# Patient Record
Sex: FEMALE | Race: WHITE | NOT HISPANIC OR LATINO
[De-identification: names, ages, dates, MRNs, and addresses within clinical notes are randomized per-mention and may not be internally consistent; named-entity substitution may affect disease eponyms.]

---

## 2017-04-21 PROBLEM — Z00.00 ENCOUNTER FOR PREVENTIVE HEALTH EXAMINATION: Status: ACTIVE | Noted: 2017-04-21

## 2017-04-26 ENCOUNTER — APPOINTMENT (OUTPATIENT)
Dept: COLORECTAL SURGERY | Facility: CLINIC | Age: 37
End: 2017-04-26

## 2017-04-26 VITALS
DIASTOLIC BLOOD PRESSURE: 84 MMHG | BODY MASS INDEX: 24.84 KG/M2 | SYSTOLIC BLOOD PRESSURE: 149 MMHG | HEIGHT: 62 IN | WEIGHT: 135 LBS | HEART RATE: 106 BPM | TEMPERATURE: 99 F | RESPIRATION RATE: 14 BRPM

## 2017-04-26 DIAGNOSIS — Z86.39 PERSONAL HISTORY OF OTHER ENDOCRINE, NUTRITIONAL AND METABOLIC DISEASE: ICD-10-CM

## 2017-04-26 DIAGNOSIS — Z87.09 PERSONAL HISTORY OF OTHER DISEASES OF THE RESPIRATORY SYSTEM: ICD-10-CM

## 2017-04-27 PROBLEM — Z86.39 HISTORY OF THYROID DISEASE: Status: RESOLVED | Noted: 2017-04-26 | Resolved: 2017-04-27

## 2017-04-27 PROBLEM — Z87.09 HISTORY OF ASTHMA: Status: RESOLVED | Noted: 2017-04-26 | Resolved: 2017-04-27

## 2017-05-06 ENCOUNTER — OUTPATIENT (OUTPATIENT)
Dept: OUTPATIENT SERVICES | Facility: HOSPITAL | Age: 37
LOS: 1 days | End: 2017-05-06
Payer: COMMERCIAL

## 2017-05-06 ENCOUNTER — APPOINTMENT (OUTPATIENT)
Dept: MAMMOGRAPHY | Facility: CLINIC | Age: 37
End: 2017-05-06

## 2017-05-06 DIAGNOSIS — Z00.8 ENCOUNTER FOR OTHER GENERAL EXAMINATION: ICD-10-CM

## 2017-05-06 DIAGNOSIS — Z12.31 ENCOUNTER FOR SCREENING MAMMOGRAM FOR MALIGNANT NEOPLASM OF BREAST: ICD-10-CM

## 2017-05-06 PROCEDURE — 77063 BREAST TOMOSYNTHESIS BI: CPT

## 2017-05-06 PROCEDURE — 77067 SCR MAMMO BI INCL CAD: CPT

## 2017-05-18 ENCOUNTER — APPOINTMENT (OUTPATIENT)
Dept: COLORECTAL SURGERY | Facility: CLINIC | Age: 37
End: 2017-05-18

## 2017-06-01 ENCOUNTER — OUTPATIENT (OUTPATIENT)
Dept: OUTPATIENT SERVICES | Facility: HOSPITAL | Age: 37
LOS: 1 days | End: 2017-06-01
Payer: COMMERCIAL

## 2017-06-01 ENCOUNTER — APPOINTMENT (OUTPATIENT)
Dept: ULTRASOUND IMAGING | Facility: CLINIC | Age: 37
End: 2017-06-01

## 2017-06-01 DIAGNOSIS — Z00.8 ENCOUNTER FOR OTHER GENERAL EXAMINATION: ICD-10-CM

## 2017-06-01 PROCEDURE — 76641 ULTRASOUND BREAST COMPLETE: CPT

## 2017-06-08 ENCOUNTER — APPOINTMENT (OUTPATIENT)
Dept: COLORECTAL SURGERY | Facility: CLINIC | Age: 37
End: 2017-06-08

## 2017-06-08 VITALS
BODY MASS INDEX: 24.84 KG/M2 | HEART RATE: 98 BPM | RESPIRATION RATE: 14 BRPM | WEIGHT: 135 LBS | TEMPERATURE: 98 F | SYSTOLIC BLOOD PRESSURE: 120 MMHG | DIASTOLIC BLOOD PRESSURE: 71 MMHG | HEIGHT: 62 IN

## 2017-06-08 DIAGNOSIS — R10.31 RIGHT LOWER QUADRANT PAIN: ICD-10-CM

## 2017-06-17 ENCOUNTER — APPOINTMENT (OUTPATIENT)
Dept: ULTRASOUND IMAGING | Facility: CLINIC | Age: 37
End: 2017-06-17

## 2017-06-17 ENCOUNTER — OUTPATIENT (OUTPATIENT)
Dept: OUTPATIENT SERVICES | Facility: HOSPITAL | Age: 37
LOS: 1 days | End: 2017-06-17
Payer: COMMERCIAL

## 2017-06-17 DIAGNOSIS — R10.31 RIGHT LOWER QUADRANT PAIN: ICD-10-CM

## 2017-06-17 DIAGNOSIS — Z00.8 ENCOUNTER FOR OTHER GENERAL EXAMINATION: ICD-10-CM

## 2017-06-17 PROCEDURE — 76705 ECHO EXAM OF ABDOMEN: CPT

## 2017-06-21 ENCOUNTER — OTHER (OUTPATIENT)
Age: 37
End: 2017-06-21

## 2017-10-20 ENCOUNTER — APPOINTMENT (OUTPATIENT)
Dept: RADIOLOGY | Facility: CLINIC | Age: 37
End: 2017-10-20
Payer: COMMERCIAL

## 2017-10-20 ENCOUNTER — OUTPATIENT (OUTPATIENT)
Dept: OUTPATIENT SERVICES | Facility: HOSPITAL | Age: 37
LOS: 1 days | End: 2017-10-20
Payer: COMMERCIAL

## 2017-10-20 DIAGNOSIS — Z00.8 ENCOUNTER FOR OTHER GENERAL EXAMINATION: ICD-10-CM

## 2017-10-20 DIAGNOSIS — J45.20 MILD INTERMITTENT ASTHMA, UNCOMPLICATED: ICD-10-CM

## 2017-10-20 PROCEDURE — 71046 X-RAY EXAM CHEST 2 VIEWS: CPT

## 2017-10-20 PROCEDURE — 71020: CPT | Mod: 26

## 2019-01-15 ENCOUNTER — RECORD ABSTRACTING (OUTPATIENT)
Age: 39
End: 2019-01-15

## 2019-01-15 DIAGNOSIS — Z30.09 ENCOUNTER FOR OTHER GENERAL COUNSELING AND ADVICE ON CONTRACEPTION: ICD-10-CM

## 2019-01-15 DIAGNOSIS — Z78.9 OTHER SPECIFIED HEALTH STATUS: ICD-10-CM

## 2019-01-15 LAB — CYTOLOGY CVX/VAG DOC THIN PREP: NORMAL

## 2019-01-15 RX ORDER — MULTIVITAMIN
TABLET ORAL
Refills: 0 | Status: ACTIVE | COMMUNITY

## 2019-02-26 ENCOUNTER — APPOINTMENT (OUTPATIENT)
Dept: OBGYN | Facility: CLINIC | Age: 39
End: 2019-02-26
Payer: COMMERCIAL

## 2019-02-26 VITALS
SYSTOLIC BLOOD PRESSURE: 116 MMHG | BODY MASS INDEX: 24.84 KG/M2 | WEIGHT: 135 LBS | HEIGHT: 62 IN | DIASTOLIC BLOOD PRESSURE: 78 MMHG

## 2019-02-26 DIAGNOSIS — E05.00 THYROTOXICOSIS WITH DIFFUSE GOITER W/OUT THYROTOXIC CRISIS OR STORM: ICD-10-CM

## 2019-02-26 DIAGNOSIS — D64.9 ANEMIA, UNSPECIFIED: ICD-10-CM

## 2019-02-26 DIAGNOSIS — N92.0 EXCESSIVE AND FREQUENT MENSTRUATION WITH REGULAR CYCLE: ICD-10-CM

## 2019-02-26 DIAGNOSIS — Z01.419 ENCOUNTER FOR GYNECOLOGICAL EXAMINATION (GENERAL) (ROUTINE) W/OUT ABNORMAL FINDINGS: ICD-10-CM

## 2019-02-26 LAB
BILIRUB UR QL STRIP: NORMAL
GLUCOSE UR-MCNC: NORMAL
HCG UR QL: 0.2 EU/DL
HCG UR QL: POSITIVE
HGB UR QL STRIP.AUTO: ABNORMAL
KETONES UR-MCNC: ABNORMAL
LEUKOCYTE ESTERASE UR QL STRIP: NORMAL
NITRITE UR QL STRIP: NORMAL
PH UR STRIP: 5
PROT UR STRIP-MCNC: NORMAL
QUALITY CONTROL: YES
SP GR UR STRIP: 1.02

## 2019-02-26 PROCEDURE — 99395 PREV VISIT EST AGE 18-39: CPT

## 2019-02-26 PROCEDURE — 81003 URINALYSIS AUTO W/O SCOPE: CPT | Mod: QW

## 2019-02-26 PROCEDURE — 81025 URINE PREGNANCY TEST: CPT

## 2019-02-26 RX ORDER — CHROMIUM 200 MCG
TABLET ORAL
Refills: 0 | Status: ACTIVE | COMMUNITY

## 2019-02-26 NOTE — HISTORY OF PRESENT ILLNESS
[Good] : being in good health [Healthy Diet] : a healthy diet [Regular Exercise] : regular exercise [Reproductive Age] : is of reproductive age [Menarche Age ____] : age at menarche was [unfilled] [Definite ___ (Date)] : the last menstrual period was [unfilled] [Regular Duration] : has been regular [Pregnancy History] : pregnancy history: [Total Preg ___] : [unfilled] [Full Term ___] : [unfilled] [Living ___] : [unfilled] [Multiple Births ___] :  multiple birth pregnancies: [unfilled] [Monogamous (Male Partner)] : is monogamous with a male partner [Definite:  ___ (Date)] : the last menstrual period was [unfilled] [Normal Amount/Duration] : was of a normal amount and duration [Regular Cycle Intervals] : periods have been regular [Menarche Age: ____] : age at menarche was [unfilled] [Sexually Active] : is sexually active [Monogamous] : is monogamous [Male ___] : [unfilled] male [Last Pap Smear ___] : last Papanicolaou cytology done [unfilled] [Last HPV Screen ___] : last human papilloma virus screening done [unfilled] [Last Mammogram ___] : last mammogram done [unfilled] [Excessive Bleeding] : there was excessive bleeding [Heavy Bleeding] : described as heavy in severity [Weight Concerns] : no concerns with her weight [Menstrual Problems] : reports normal menses [de-identified] : Breast US 6/1/2017 - Br 1 [Fever] : no fever [Nausea] : no nausea [Vomiting] : no vomiting [Diarrhea] : no diarrhea [Vaginal Discharge] : no vaginal discharge [Vaginal Bleeding] : no vaginal bleeding [Pelvic Pressure] : no pelvic pressure [Dysuria] : no dysuria [Pain with BMs] : no pain with bowel movements [Dysmenorrhea] : no dysmenorrhea [Irregular Menses] : normal menses [Pain] : no pelvic pain [Dizziness] : no dizziness [Palpitations] : no palpitations [Localized Pain] : no localized pain [Mass (___cm)] : no palpable mass [Diffused Pain] : no diffused pain [Nipple Discharge] : no nipple discharge [Skin Color Change] : no skin color change [Contraception] : does not use contraception

## 2019-02-26 NOTE — PHYSICAL EXAM
[Awake] : awake [Alert] : alert [Soft] : soft [Oriented x3] : oriented to person, place, and time [Normal] : uterus [No Bleeding] : there was no active vaginal bleeding [Uterine Adnexae] : were not tender and not enlarged [Goiter] : goiter [RRR, No Murmurs] : RRR, no murmurs [CTAB] : CTAB [Acute Distress] : no acute distress [LAD] : no lymphadenopathy [Thyroid Nodule] : no thyroid nodule [Mass] : no breast mass [Nipple Discharge] : no nipple discharge [Axillary LAD] : no axillary lymphadenopathy [Tender] : non tender

## 2019-03-01 LAB — CYTOLOGY CVX/VAG DOC THIN PREP: NORMAL

## 2019-03-03 LAB — HPV HIGH+LOW RISK DNA PNL CVX: NOT DETECTED

## 2019-06-21 ENCOUNTER — FORM ENCOUNTER (OUTPATIENT)
Age: 39
End: 2019-06-21

## 2019-06-22 ENCOUNTER — APPOINTMENT (OUTPATIENT)
Dept: MAMMOGRAPHY | Facility: CLINIC | Age: 39
End: 2019-06-22
Payer: COMMERCIAL

## 2019-06-22 ENCOUNTER — OUTPATIENT (OUTPATIENT)
Dept: OUTPATIENT SERVICES | Facility: HOSPITAL | Age: 39
LOS: 1 days | End: 2019-06-22
Payer: COMMERCIAL

## 2019-06-22 ENCOUNTER — APPOINTMENT (OUTPATIENT)
Dept: ULTRASOUND IMAGING | Facility: CLINIC | Age: 39
End: 2019-06-22
Payer: COMMERCIAL

## 2019-06-22 DIAGNOSIS — N60.19 DIFFUSE CYSTIC MASTOPATHY OF UNSPECIFIED BREAST: ICD-10-CM

## 2019-06-22 PROCEDURE — 77067 SCR MAMMO BI INCL CAD: CPT

## 2019-06-22 PROCEDURE — 77067 SCR MAMMO BI INCL CAD: CPT | Mod: 26

## 2019-06-22 PROCEDURE — 76641 ULTRASOUND BREAST COMPLETE: CPT

## 2019-06-22 PROCEDURE — 77063 BREAST TOMOSYNTHESIS BI: CPT | Mod: 26

## 2019-06-22 PROCEDURE — 77063 BREAST TOMOSYNTHESIS BI: CPT

## 2019-06-22 PROCEDURE — 76641 ULTRASOUND BREAST COMPLETE: CPT | Mod: 26,50

## 2019-11-13 ENCOUNTER — APPOINTMENT (OUTPATIENT)
Dept: SURGICAL ONCOLOGY | Facility: CLINIC | Age: 39
End: 2019-11-13
Payer: COMMERCIAL

## 2019-11-13 VITALS
BODY MASS INDEX: 22.63 KG/M2 | DIASTOLIC BLOOD PRESSURE: 72 MMHG | HEIGHT: 62 IN | HEART RATE: 91 BPM | SYSTOLIC BLOOD PRESSURE: 109 MMHG | WEIGHT: 123 LBS

## 2019-11-13 PROCEDURE — 99204 OFFICE O/P NEW MOD 45 MIN: CPT

## 2019-11-13 NOTE — PHYSICAL EXAM
[Normal] : supple, no neck mass and thyroid not enlarged [Normal Supraclavicular Lymph Nodes] : normal supraclavicular lymph nodes [Normal Axillary Lymph Nodes] : normal axillary lymph nodes [Normal] : oriented to person, place and time, with appropriate affect [FreeTextEntry1] : AB present during exam  [de-identified] : No dominant mass or nipple discharge bilaterally.

## 2019-11-13 NOTE — ASSESSMENT
[FreeTextEntry1] : 39 year-old female presents for an initial consultation.  She completed routine breast imaging in June 2019.  There was no mammographic evidence of malignancy.  Ultrasound revealed a 0.3 cm benign-appearing septated cyst in the left breast at 11:00, 3 cmfn (BIRADS 2).  Patient states that her gynecologist asked her to to see me regarding the cyst seen on ultrasound.  \par \par She has no prior issues related to her breasts.  She denies any family history of breast or ovarian cancer.  Her past medical history includes asthma, Graves disease and anxiety which are all well controlled.  She was also diagnosed with an atypical nevus involving her left pretibial region and she is scheduled for excision of the lesion in the near future.  She is asymptomatic and denies any palpable breast mass, nipple discharge, nipple inversion or skin changes.  \par \par A&P:\par Benign appearing cyst on breast ultrasound.  No worrisome findings on breast imaging or physical exam and no significant breast cancer risk factors.  Patient should continue yearly surveillance mammograms and sonograms and RTO for yearly breast exam. I have reviewed the pertinent imaging, bloodwork and pathology.

## 2019-11-13 NOTE — CONSULT LETTER
[Dear  ___] : Dear  [unfilled], [Consult Letter:] : I had the pleasure of evaluating your patient, [unfilled]. [Consult Closing:] : Thank you very much for allowing me to participate in the care of this patient.  If you have any questions, please do not hesitate to contact me. [Sincerely,] : Sincerely, [DrBenjamin  ___] : Dr. SOSA [FreeTextEntry2] : Kiera Shirley MD [FreeTextEntry3] : Pavan Guerrero MD, FACS, FASCRS\par , Department of Surgery\par Director of the [facility] [place]\par , Minimally Invasive/Robotic Cancer Surgery, Central & [facility] Divisions\par Division of Surgical Oncology  [FreeTextEntry1] : 39 year-old female presents for an initial consultation.  She completed routine breast imaging in June 2019.  There was no mammographic evidence of malignancy.  Ultrasound revealed a 0.3 cm benign-appearing septated cyst in the left breast at 11:00, 3 cmfn (BIRADS 2).  Patient states that her gynecologist asked her to to see me regarding the cyst seen on ultrasound.  \par \par She has no prior issues related to her breasts.  She denies any family history of breast or ovarian cancer.  Her past medical history includes asthma, Graves disease and anxiety which are all well controlled.  She was also diagnosed with an atypical nevus involving her left pretibial region and she is scheduled for excision of the lesion in the near future.  She is asymptomatic and denies any palpable breast mass, nipple discharge, nipple inversion or skin changes.  \par \par A&P:\par Benign appearing cyst on breast ultrasound.  No worrisome findings on breast imaging or physical exam and no significant breast cancer risk factors.  Patient should continue yearly surveillance mammograms and sonograms and RTO for yearly breast exam. I have reviewed the pertinent imaging, bloodwork and pathology. \par \par Referring MD: Dr. Kiera Shirley\par PCP: Dr. Monica Gomez

## 2019-11-13 NOTE — OB HISTORY
[Menarche Age ____] : menarche age [unfilled] [Definite ___ (Date)] : the last menstrual period was [unfilled] [Total Preg ___] : G[unfilled] [Live Births ___] : P[unfilled]  [FreeTextEntry2] : age 29 at first pregnancy

## 2019-11-13 NOTE — HISTORY OF PRESENT ILLNESS
[de-identified] : 39 year-old female presents for an initial consultation.  She completed routine breast imaging in June 2019.  There was no mammographic evidence of malignancy.  Ultrasound revealed a 0.3 cm benign-appearing septated cyst in the left breast at 11:00, 3 cmfn (BIRADS 2).  Patient states that her gynecologist asked her to to see me regarding the cyst seen on ultrasound.  \par \par She has no prior issues related to her breasts.  She denies any family history of breast or ovarian cancer.  Her past medical history includes asthma, Graves disease and anxiety which are all well controlled.  She was also diagnosed with an atypical nevus involving her left pretibial region and she is scheduled for excision of the lesion in the near future.  She is asymptomatic and denies any palpable breast mass, nipple discharge, nipple inversion or skin changes.  \par \par Referring MD: Dr. Kiera Shirley\par PCP: Dr. Monica Gomez

## 2020-02-03 ENCOUNTER — APPOINTMENT (OUTPATIENT)
Dept: SURGERY | Facility: CLINIC | Age: 40
End: 2020-02-03

## 2020-10-05 ENCOUNTER — APPOINTMENT (OUTPATIENT)
Dept: OBGYN | Facility: CLINIC | Age: 40
End: 2020-10-05

## 2020-11-05 ENCOUNTER — APPOINTMENT (OUTPATIENT)
Dept: OBGYN | Facility: CLINIC | Age: 40
End: 2020-11-05
Payer: COMMERCIAL

## 2020-11-05 ENCOUNTER — TRANSCRIPTION ENCOUNTER (OUTPATIENT)
Age: 40
End: 2020-11-05

## 2020-11-05 VITALS
SYSTOLIC BLOOD PRESSURE: 100 MMHG | WEIGHT: 134 LBS | BODY MASS INDEX: 24.66 KG/M2 | DIASTOLIC BLOOD PRESSURE: 60 MMHG | HEIGHT: 62 IN

## 2020-11-05 LAB
BILIRUB UR QL STRIP: NORMAL
GLUCOSE UR-MCNC: NORMAL
HCG UR QL: 0.2 EU/DL
HGB UR QL STRIP.AUTO: NORMAL
KETONES UR-MCNC: NORMAL
LEUKOCYTE ESTERASE UR QL STRIP: NORMAL
NITRITE UR QL STRIP: NORMAL
PH UR STRIP: 6.5
PROT UR STRIP-MCNC: NORMAL
SP GR UR STRIP: 1.02

## 2020-11-05 PROCEDURE — 36415 COLL VENOUS BLD VENIPUNCTURE: CPT

## 2020-11-05 PROCEDURE — 99396 PREV VISIT EST AGE 40-64: CPT

## 2020-11-05 PROCEDURE — 99072 ADDL SUPL MATRL&STAF TM PHE: CPT

## 2020-11-05 PROCEDURE — 82270 OCCULT BLOOD FECES: CPT

## 2020-11-05 PROCEDURE — 81003 URINALYSIS AUTO W/O SCOPE: CPT | Mod: NC,QW

## 2020-11-06 ENCOUNTER — NON-APPOINTMENT (OUTPATIENT)
Age: 40
End: 2020-11-06

## 2020-11-08 LAB
25(OH)D3 SERPL-MCNC: 29.4 NG/ML
ALBUMIN SERPL ELPH-MCNC: 4.5 G/DL
ALP BLD-CCNC: 74 U/L
ALT SERPL-CCNC: 20 U/L
ANION GAP SERPL CALC-SCNC: 12 MMOL/L
AST SERPL-CCNC: 19 U/L
BASOPHILS # BLD AUTO: 0.04 K/UL
BASOPHILS NFR BLD AUTO: 0.6 %
BILIRUB SERPL-MCNC: 0.3 MG/DL
BUN SERPL-MCNC: 13 MG/DL
CALCIUM SERPL-MCNC: 8.6 MG/DL
CHLORIDE SERPL-SCNC: 104 MMOL/L
CO2 SERPL-SCNC: 22 MMOL/L
CREAT SERPL-MCNC: 0.61 MG/DL
DATE COLLECTED: NORMAL
EOSINOPHIL # BLD AUTO: 0.12 K/UL
EOSINOPHIL NFR BLD AUTO: 1.7 %
GLUCOSE SERPL-MCNC: 53 MG/DL
HAV IGM SER QL: NONREACTIVE
HBV CORE IGM SER QL: NONREACTIVE
HBV SURFACE AG SER QL: NONREACTIVE
HCT VFR BLD CALC: 35 %
HCV AB SER QL: NONREACTIVE
HCV S/CO RATIO: 0.14 S/CO
HEMOCCULT SP1 STL QL: NEGATIVE
HGB BLD-MCNC: 10.1 G/DL
HPV HIGH+LOW RISK DNA PNL CVX: NOT DETECTED
IMM GRANULOCYTES NFR BLD AUTO: 0.3 %
LYMPHOCYTES # BLD AUTO: 1.79 K/UL
LYMPHOCYTES NFR BLD AUTO: 25.3 %
MAN DIFF?: NORMAL
MCHC RBC-ENTMCNC: 21 PG
MCHC RBC-ENTMCNC: 28.9 GM/DL
MCV RBC AUTO: 72.8 FL
MONOCYTES # BLD AUTO: 0.64 K/UL
MONOCYTES NFR BLD AUTO: 9 %
NEUTROPHILS # BLD AUTO: 4.47 K/UL
NEUTROPHILS NFR BLD AUTO: 63.1 %
PLATELET # BLD AUTO: 333 K/UL
POTASSIUM SERPL-SCNC: 4.2 MMOL/L
PROT SERPL-MCNC: 7.1 G/DL
QUALITY CONTROL: YES
RBC # BLD: 4.81 M/UL
RBC # FLD: 18.2 %
SARS-COV-2 IGG SERPL IA-ACNC: 0.09 INDEX
SARS-COV-2 IGG SERPL QL IA: NEGATIVE
SODIUM SERPL-SCNC: 138 MMOL/L
WBC # FLD AUTO: 7.08 K/UL

## 2020-11-08 NOTE — END OF VISIT
[FreeTextEntry3] : I, Tal Feng, acted solely as a scribe for Dr. Zamudio on this date 11/05/2020.\par All medical record entries made by the Scribe were at my, Dr. Zamudio's direction and personally dictated by me on  11/05/2020. I have reviewed the chart and agree that the record accurately reflects my personal performance of the history, physical exam, assessment and plan. I have also personally directed, reviewed, and agreed with the chart.\par

## 2020-11-08 NOTE — DISCUSSION/SUMMARY
[FreeTextEntry1] : During this visit comprehensive counseling was given regarding the following concerns:\par 1 We discussed the need for proper nutrition and exercise.  This includes cardiovascular and pelvic floor exercise.\par \par 2 We discussed the importance of maintaining a proper vaccination schedule and we discussed the utility of the flu vaccine and TDaP.\par \par 3 We discussed the impact of chronic sun exposure and the risk for skin disease as a result. The benefits of a total body scan by a Dermatologist was reviewed.\par \par 4 We discussed the need for certain supplements for most people which include vitamin D3, calcium rich foods with limitation on calcium supplementation by tabular form to 600 mg by mouth daily.  As part of a bone health program we recommend weight bearing exercises, and some limited sun exposure ( 10- 15 minutes daily) to help convert vitamin D to its active form.\par \par 5 Rx for mammogram screening and breast US given.\par  \par 6. CBC and metabolic panel drawn today.\par \par She will follow up annually and as needed.\par \par During this visit 15 minutes were spent face-to-face with greater than 50% of the time dedicated to counseling.\par \par \par \par

## 2020-11-08 NOTE — HISTORY OF PRESENT ILLNESS
[Patient reported PAP Smear was normal] : Patient reported PAP Smear was normal [N] : Patient reports normal menses [Menarche Age: ____] : age at menarche was [unfilled] [No] : Patient does not have concerns regarding sex [Currently Active] : currently active [Men] : men [Y] : Patient uses contraception [Yes] : Yes [Condoms] : Condoms [TextBox_4] : Pt presents for an Annual. [Mammogramdate] : 06/22/2019 [TextBox_19] : BR2 [BreastSonogramDate] : 06/22/2019 [TextBox_25] : BR2 [PapSmeardate] : 02/26/2019 [LMPDate] : 10/15/2020 [PGxTotal] : 1 [[facility]] : 2 [[facility] 2 [PGHxMultBirths] : 1 [FreeTextEntry1] : 10/15/2020

## 2020-12-21 PROBLEM — Z01.419 ENCOUNTER FOR ANNUAL ROUTINE GYNECOLOGICAL EXAMINATION: Status: RESOLVED | Noted: 2019-02-26 | Resolved: 2020-12-21

## 2021-01-16 ENCOUNTER — RESULT REVIEW (OUTPATIENT)
Age: 41
End: 2021-01-16

## 2021-01-16 ENCOUNTER — APPOINTMENT (OUTPATIENT)
Dept: MAMMOGRAPHY | Facility: CLINIC | Age: 41
End: 2021-01-16
Payer: COMMERCIAL

## 2021-01-16 ENCOUNTER — APPOINTMENT (OUTPATIENT)
Dept: ULTRASOUND IMAGING | Facility: CLINIC | Age: 41
End: 2021-01-16
Payer: COMMERCIAL

## 2021-01-16 ENCOUNTER — OUTPATIENT (OUTPATIENT)
Dept: OUTPATIENT SERVICES | Facility: HOSPITAL | Age: 41
LOS: 1 days | End: 2021-01-16
Payer: COMMERCIAL

## 2021-01-16 DIAGNOSIS — Z00.8 ENCOUNTER FOR OTHER GENERAL EXAMINATION: ICD-10-CM

## 2021-01-16 DIAGNOSIS — Z12.39 ENCOUNTER FOR OTHER SCREENING FOR MALIGNANT NEOPLASM OF BREAST: ICD-10-CM

## 2021-01-16 DIAGNOSIS — N60.19 DIFFUSE CYSTIC MASTOPATHY OF UNSPECIFIED BREAST: ICD-10-CM

## 2021-01-16 PROCEDURE — 77067 SCR MAMMO BI INCL CAD: CPT

## 2021-01-16 PROCEDURE — 77063 BREAST TOMOSYNTHESIS BI: CPT

## 2021-01-16 PROCEDURE — 76641 ULTRASOUND BREAST COMPLETE: CPT | Mod: 26,50

## 2021-01-16 PROCEDURE — 76641 ULTRASOUND BREAST COMPLETE: CPT

## 2021-01-16 PROCEDURE — 77067 SCR MAMMO BI INCL CAD: CPT | Mod: 26

## 2021-01-16 PROCEDURE — 77063 BREAST TOMOSYNTHESIS BI: CPT | Mod: 26

## 2021-11-17 ENCOUNTER — APPOINTMENT (OUTPATIENT)
Dept: OBGYN | Facility: CLINIC | Age: 41
End: 2021-11-17
Payer: COMMERCIAL

## 2021-11-17 ENCOUNTER — NON-APPOINTMENT (OUTPATIENT)
Age: 41
End: 2021-11-17

## 2021-11-17 VITALS
HEIGHT: 62 IN | WEIGHT: 136 LBS | BODY MASS INDEX: 25.03 KG/M2 | DIASTOLIC BLOOD PRESSURE: 74 MMHG | SYSTOLIC BLOOD PRESSURE: 112 MMHG

## 2021-11-17 DIAGNOSIS — Z83.3 FAMILY HISTORY OF DIABETES MELLITUS: ICD-10-CM

## 2021-11-17 DIAGNOSIS — Z80.0 FAMILY HISTORY OF MALIGNANT NEOPLASM OF DIGESTIVE ORGANS: ICD-10-CM

## 2021-11-17 DIAGNOSIS — Z82.49 FAMILY HISTORY OF ISCHEMIC HEART DISEASE AND OTHER DISEASES OF THE CIRCULATORY SYSTEM: ICD-10-CM

## 2021-11-17 DIAGNOSIS — Z23 ENCOUNTER FOR IMMUNIZATION: ICD-10-CM

## 2021-11-17 DIAGNOSIS — N60.09 SOLITARY CYST OF UNSPECIFIED BREAST: ICD-10-CM

## 2021-11-17 LAB
CYTOLOGY CVX/VAG DOC THIN PREP: NORMAL
DATE COLLECTED: NORMAL
HEMOCCULT SP1 STL QL: NEGATIVE
QUALITY CONTROL: YES

## 2021-11-17 PROCEDURE — 82270 OCCULT BLOOD FECES: CPT

## 2021-11-17 PROCEDURE — 99396 PREV VISIT EST AGE 40-64: CPT | Mod: 25

## 2021-11-17 PROCEDURE — 90471 IMMUNIZATION ADMIN: CPT

## 2021-11-17 PROCEDURE — 90686 IIV4 VACC NO PRSV 0.5 ML IM: CPT

## 2021-11-17 NOTE — PHYSICAL EXAM
[Chaperone Present] : A chaperone was present in the examining room during all aspects of the physical examination [Appropriately responsive] : appropriately responsive [Alert] : alert [No Acute Distress] : no acute distress [No Lymphadenopathy] : no lymphadenopathy [Soft] : soft [Non-tender] : non-tender [Non-distended] : non-distended [No HSM] : No HSM [No Lesions] : no lesions [No Mass] : no mass [Oriented x3] : oriented x3 [Examination Of The Breasts] : a normal appearance [No Masses] : no breast masses were palpable [Labia Majora] : normal [Labia Minora] : normal [Normal] : normal [Uterine Adnexae] : normal [No Tenderness] : no tenderness [Nl Sphincter Tone] : normal sphincter tone [FreeTextEntry9] : Heme negative

## 2021-11-17 NOTE — HISTORY OF PRESENT ILLNESS
[Y] : Positive pregnancy history [Menarche Age: ____] : age at menarche was [unfilled] [No] : Patient does not have concerns regarding sex [Currently Active] : currently active [Men] : men [Vaginal] : vaginal [Yes] : Yes [Condoms] : Condoms [N] : Patient does not use contraception [Mammogramdate] : 01/16/2021 [TextBox_19] : BR2 [BreastSonogramDate] : 01/16/2021 [TextBox_25] : BR2 [PapSmeardate] : 11/05/2020 [TextBox_31] : NORMAL [BoneDensityDate] : never [ColonoscopyDate] : few years ago [HPVDate] : 11/05/2020 [TextBox_78] : NEG [LMPDate] : 11/7/21 [PGxTotal] : 1 [[facility] Center[facility]ulerm] : 1 [[facility]] : 2 [PGHxMultBirths] : 1 [FreeTextEntry1] : 11/7/21

## 2021-11-17 NOTE — PLAN
[FreeTextEntry1] : During this visit comprehensive counseling was given regarding the following concerns:\par \par 1 We discussed the need for proper nutrition and exercise.  This includes cardiovascular and pelvic floor exercise.\par \par 2 We discussed the importance of maintaining a proper vaccination schedule and we discussed the utility of the flu vaccine and TDaP and Covid 19 when available.\par \par 3 We discussed the impact of chronic sun exposure and the risk for skin disease as a result. The benefits of a total body scan by a Dermatologist was reviewed..\par \par 4 We discussed the need for certain supplements for most people which include vitamin D3, calcium rich foods with limitation on calcium supplementation by tabular form to 600        mg by mouth daily.  As part of a bone health program we recommend weightbearing exercises, and some limited sun exposure ( 10-15 minutes daily) to help convert vitamin D to its active form.\par

## 2022-02-05 ENCOUNTER — APPOINTMENT (OUTPATIENT)
Dept: MAMMOGRAPHY | Facility: CLINIC | Age: 42
End: 2022-02-05

## 2022-02-05 ENCOUNTER — RESULT REVIEW (OUTPATIENT)
Age: 42
End: 2022-02-05

## 2022-02-05 ENCOUNTER — APPOINTMENT (OUTPATIENT)
Dept: ULTRASOUND IMAGING | Facility: CLINIC | Age: 42
End: 2022-02-05

## 2022-02-05 ENCOUNTER — OUTPATIENT (OUTPATIENT)
Dept: OUTPATIENT SERVICES | Facility: HOSPITAL | Age: 42
LOS: 1 days | End: 2022-02-05
Payer: COMMERCIAL

## 2022-02-05 DIAGNOSIS — R92.2 INCONCLUSIVE MAMMOGRAM: ICD-10-CM

## 2022-02-05 DIAGNOSIS — Z12.39 ENCOUNTER FOR OTHER SCREENING FOR MALIGNANT NEOPLASM OF BREAST: ICD-10-CM

## 2022-02-05 DIAGNOSIS — Z12.31 ENCOUNTER FOR SCREENING MAMMOGRAM FOR MALIGNANT NEOPLASM OF BREAST: ICD-10-CM

## 2022-02-05 DIAGNOSIS — N60.02 SOLITARY CYST OF LEFT BREAST: ICD-10-CM

## 2022-02-05 PROCEDURE — 77067 SCR MAMMO BI INCL CAD: CPT

## 2022-02-05 PROCEDURE — 76641 ULTRASOUND BREAST COMPLETE: CPT | Mod: 26,50

## 2022-02-05 PROCEDURE — 77063 BREAST TOMOSYNTHESIS BI: CPT | Mod: 26

## 2022-02-05 PROCEDURE — 77067 SCR MAMMO BI INCL CAD: CPT | Mod: 26

## 2022-02-05 PROCEDURE — 77063 BREAST TOMOSYNTHESIS BI: CPT

## 2022-02-05 PROCEDURE — 76641 ULTRASOUND BREAST COMPLETE: CPT

## 2022-07-05 ENCOUNTER — APPOINTMENT (OUTPATIENT)
Dept: OBGYN | Facility: CLINIC | Age: 42
End: 2022-07-05

## 2022-07-05 VITALS
BODY MASS INDEX: 25.76 KG/M2 | HEIGHT: 62 IN | DIASTOLIC BLOOD PRESSURE: 62 MMHG | WEIGHT: 140 LBS | SYSTOLIC BLOOD PRESSURE: 120 MMHG

## 2022-07-05 LAB
CYTOLOGY CVX/VAG DOC THIN PREP: NORMAL
HPV HIGH+LOW RISK DNA PNL CVX: NOT DETECTED

## 2022-07-05 PROCEDURE — 99214 OFFICE O/P EST MOD 30 MIN: CPT

## 2022-07-14 NOTE — REVIEW OF SYSTEMS
[Patient Intake Form Reviewed] : Patient intake form was reviewed [Breast Pain] : breast pain [Negative] : Heme/Lymph [CVA Pain] : no CVA pain [Breast Lump] : no breast lump [Nipple Changes] : no nipple changes [Nipple Discharge] : no nipple discharge [Skin Lesion on Breast] : no skin lesion on breast [de-identified] : tenderness across the leteral aspect of the left breast.

## 2022-07-14 NOTE — DISCUSSION/SUMMARY
[FreeTextEntry1] : We discussed the issues and she had some lymphadenopathy after her Covid vaccine on the left side. She now has intermittent tenderness but no palpable mass or adenopathy. We will reimage the left breast and left axilla. She will consider a consult with Dr. Colon (Breast ) if her symptoms persist. She will call after the studies have been done.

## 2022-07-14 NOTE — PHYSICAL EXAM
[Chaperone Present] : A chaperone was present in the examining room during all aspects of the physical examination [FreeTextEntry1] : ALYSSA Shoemaker [Appropriately responsive] : appropriately responsive [Alert] : alert [No Acute Distress] : no acute distress [Tender] : tender [Axillary LAD] : axillary LAD [Oriented x3] : oriented x3 [FreeTextEntry6] : left side [Examination Of The Breasts] : a normal appearance [Normal] : normal [Tenderness Of The Left Breast] : tenderness [No Masses] : no breast masses were palpable

## 2022-07-14 NOTE — HISTORY OF PRESENT ILLNESS
[N] : Patient does not use contraception [Y] : Positive pregnancy history [Menarche Age: ____] : age at menarche was [unfilled] [Currently Active] : currently active [Mammogramdate] : 2/05/22 [TextBox_19] : br2 [BreastSonogramDate] : 2/05/22 [TextBox_25] : br2 [PapSmeardate] : 11/17/21 [TextBox_31] : neg [ColonoscopyDate] : 5/18/17 [HPVDate] : 11/17/21 [TextBox_78] : neg [LMPDate] : 6/30/22 [PGxTotal] : 1 [[facility]] : 1 [[facility]] : 2 [PGHxMultBirths] : 1 [FreeTextEntry1] : 6/30/22

## 2022-07-23 ENCOUNTER — NON-APPOINTMENT (OUTPATIENT)
Age: 42
End: 2022-07-23

## 2022-08-04 ENCOUNTER — APPOINTMENT (OUTPATIENT)
Dept: BREAST CENTER | Facility: CLINIC | Age: 42
End: 2022-08-04

## 2022-08-04 VITALS
BODY MASS INDEX: 25.03 KG/M2 | HEIGHT: 62 IN | DIASTOLIC BLOOD PRESSURE: 81 MMHG | WEIGHT: 136 LBS | SYSTOLIC BLOOD PRESSURE: 117 MMHG | TEMPERATURE: 97.3 F | HEART RATE: 83 BPM

## 2022-08-04 DIAGNOSIS — N64.4 MASTODYNIA: ICD-10-CM

## 2022-08-04 DIAGNOSIS — Z78.9 OTHER SPECIFIED HEALTH STATUS: ICD-10-CM

## 2022-08-04 DIAGNOSIS — Z86.018 PERSONAL HISTORY OF OTHER BENIGN NEOPLASM: ICD-10-CM

## 2022-08-04 DIAGNOSIS — Z86.39 PERSONAL HISTORY OF OTHER ENDOCRINE, NUTRITIONAL AND METABOLIC DISEASE: ICD-10-CM

## 2022-08-04 DIAGNOSIS — N64.89 OTHER SPECIFIED DISORDERS OF BREAST: ICD-10-CM

## 2022-08-04 DIAGNOSIS — Z87.891 PERSONAL HISTORY OF NICOTINE DEPENDENCE: ICD-10-CM

## 2022-08-04 DIAGNOSIS — R22.32 LOCALIZED SWELLING, MASS AND LUMP, LEFT UPPER LIMB: ICD-10-CM

## 2022-08-04 PROCEDURE — 99203 OFFICE O/P NEW LOW 30 MIN: CPT

## 2022-08-04 RX ORDER — UBIDECARENONE/VIT E ACET 100MG-5
CAPSULE ORAL
Refills: 0 | Status: ACTIVE | COMMUNITY

## 2022-08-04 RX ORDER — MONTELUKAST 10 MG/1
10 TABLET, FILM COATED ORAL
Qty: 30 | Refills: 0 | Status: ACTIVE | COMMUNITY
Start: 2022-03-17

## 2022-08-04 RX ORDER — LEVOTHYROXINE SODIUM 0.03 MG/1
25 TABLET ORAL
Refills: 0 | Status: ACTIVE | COMMUNITY

## 2022-08-04 RX ORDER — AZELASTINE HYDROCHLORIDE 137 UG/1
0.1 SPRAY, METERED NASAL
Qty: 30 | Refills: 0 | Status: ACTIVE | COMMUNITY
Start: 2022-03-17

## 2022-08-04 RX ORDER — DOXYCYCLINE HYCLATE 100 MG/1
100 CAPSULE ORAL
Qty: 14 | Refills: 0 | Status: ACTIVE | COMMUNITY
Start: 2022-03-17

## 2022-08-04 RX ORDER — LEVOTHYROXINE SODIUM 0.03 MG/1
25 TABLET ORAL
Qty: 45 | Refills: 0 | Status: ACTIVE | COMMUNITY
Start: 2022-05-18

## 2022-08-04 RX ORDER — LEVOTHYROXINE SODIUM 0.05 MG/1
50 TABLET ORAL
Qty: 45 | Refills: 0 | Status: ACTIVE | COMMUNITY
Start: 2022-07-28

## 2022-08-04 RX ORDER — FERROUS SULFATE 15 MG/ML
75 (15 FE) DROPS ORAL
Refills: 0 | Status: ACTIVE | COMMUNITY

## 2022-08-04 RX ORDER — FLUTICASONE PROPIONATE AND SALMETEROL 50; 250 UG/1; UG/1
250-50 POWDER RESPIRATORY (INHALATION)
Qty: 60 | Refills: 0 | Status: ACTIVE | COMMUNITY
Start: 2022-04-14

## 2022-08-04 RX ORDER — AZITHROMYCIN 250 MG/1
250 TABLET, FILM COATED ORAL
Qty: 6 | Refills: 0 | Status: ACTIVE | COMMUNITY
Start: 2022-07-15

## 2022-08-04 NOTE — PAST MEDICAL HISTORY
[Menarche Age ____] : age at menarche was [unfilled] [Definite ___ (Date)] : the last menstrual period was [unfilled] [Total Preg ___] : G[unfilled] [Living ___] : Living: [unfilled] [Age At Live Birth ___] : Age at live birth: [unfilled] [FreeTextEntry8] : yes 8 wks

## 2022-08-04 NOTE — DATA REVIEWED
[FreeTextEntry1] : 2/5/22, [place], Bilat sMMG: TC 24.2%, ext dense, no susp findings, prominent L axillary LN partially imaged likely r/t recent covid vaccine; Bilat U/S: R no susp solid mass, L no susp solid mass, 11:00 2N 4mm cyst, rec mmg 1 year and L axillary U/S if adenopathy persists, BR2\par \par 7/11/22 ZP, left mmg, extremely dense, Br1.  US cystic cluster, and stable benign-appearing hypoechoic nodule/cyst Br2\par

## 2022-08-04 NOTE — PHYSICAL EXAM
[Bra Size: ___] : Bra Size: [unfilled] [Normocephalic] : normocephalic [Supple] : supple [No Cervical Adenopathy] : no cervical adenopathy [No Thyromegaly] : no thyromegaly [Examined in the supine and seated position] : examined in the supine and seated position [Symmetrical] : symmetrical [No dominant masses] : no dominant masses in right breast  [No dominant masses] : no dominant masses left breast [No Nipple Retraction] : no left nipple retraction [No Nipple Discharge] : no left nipple discharge [No Axillary Lymphadenopathy] : no left axillary lymphadenopathy [No Edema] : no edema [No Swelling] : no swelling [Full ROM] : full range of motion [No Rashes] : no rashes [No Ulceration] : no ulceration [de-identified] : \par Neuro: intact grossly, gait normal

## 2022-08-04 NOTE — ASSESSMENT
[FreeTextEntry1] : Pt is a 42 year old female referred for consultation by Dr. Dajuan Hubbard for mastalgia and elevated TC. She noted pain and swelling to bilateral breasts about 2 months ago, after one cycle of her menses swelling and pain to rt breast subsided however left breast remained swollen and painful. She notes after finishing her last menstral cycle pain and swelling to left breast has since subsided. She denies erythema, no palpable masses.\par \par 2/5/22, [place], Bilat sMMG: TC 24.2%, ext dense, no susp findings, prominent L axillary LN partially imaged likely r/t recent covid vaccine; Bilat U/S: R no susp solid mass, L no susp solid mass, 11:00 2N 4mm cyst, rec mmg 1 year and L axillary U/S if adenopathy persists, BR2\par \par 7/11/22 ZP, left mmg, extremely dense, Br1.  US cystic cluster, and stable benign-appearing hypoechoic nodule/cyst Br2\par \par Fhx: Cousin with stomach cancer in his 30's\par \par CBE: FCC breast, no palpable masses, No suspicious findings. LM\par TC score calculated in office today using the Sherice calculator was Lifetime risk was 17.5%. Discussed with pt the TC of 24.2% was only taking into account breast density and not other factors used in the SHERICE calculator. She has no fhx and no prior biopsies. Pt is not a high risk pt based on the SHERICE.\par Also reviewed recent ZP u/s of cysts on the left, none suspicious and BR2. Would rec annual mmg and u/s 2/23. F.u after with NP.

## 2022-08-04 NOTE — HISTORY OF PRESENT ILLNESS
[FreeTextEntry1] : Pt is a 42 year old female referred for consultation by Dr. Dajuan Hubbard for mastalgia and elevated TC. She noted pain and swelling to bilateral breasts about 2 months ago, after one cycle of her menses swelling and pain to rt breast subsided however left breast remained swollen and painful. She notes after finishing her last menstral cycle pain and swelling to left breast has since subsided. She denies erythema, no palpable masses.\par \par 2/5/22, [place], Bilat sMMG: TC 24.2%, ext dense, no susp findings, prominent L axillary LN partially imaged likely r/t recent covid vaccine; Bilat U/S: R no susp solid mass, L no susp solid mass, 11:00 2N 4mm cyst, rec mmg 1 year and L axillary U/S if adenopathy persists, BR2\par \par 7/11/22 ZP, left mmg, extremely dense, Br1.  US cystic cluster, and stable benign-appearing hypoechoic nodule/cyst Br2\par \par Fhx: Cousin with stomach cancer in his 30's

## 2022-11-21 ENCOUNTER — APPOINTMENT (OUTPATIENT)
Dept: OBGYN | Facility: CLINIC | Age: 42
End: 2022-11-21
Payer: COMMERCIAL

## 2022-11-21 VITALS
WEIGHT: 128 LBS | BODY MASS INDEX: 23.55 KG/M2 | DIASTOLIC BLOOD PRESSURE: 72 MMHG | HEIGHT: 62 IN | SYSTOLIC BLOOD PRESSURE: 128 MMHG

## 2022-11-21 DIAGNOSIS — Z12.12 ENCOUNTER FOR SCREENING FOR MALIGNANT NEOPLASM OF RECTUM: ICD-10-CM

## 2022-11-21 DIAGNOSIS — Z12.4 ENCOUNTER FOR SCREENING FOR MALIGNANT NEOPLASM OF CERVIX: ICD-10-CM

## 2022-11-21 DIAGNOSIS — Z12.39 ENCOUNTER FOR OTHER SCREENING FOR MALIGNANT NEOPLASM OF BREAST: ICD-10-CM

## 2022-11-21 DIAGNOSIS — E06.3 AUTOIMMUNE THYROIDITIS: ICD-10-CM

## 2022-11-21 DIAGNOSIS — N60.19 DIFFUSE CYSTIC MASTOPATHY OF UNSPECIFIED BREAST: ICD-10-CM

## 2022-11-21 LAB
DATE COLLECTED: NORMAL
HEMOCCULT SP1 STL QL: NEGATIVE
QUALITY CONTROL: YES

## 2022-11-21 PROCEDURE — XXXXX: CPT | Mod: 1L

## 2022-11-22 ENCOUNTER — APPOINTMENT (OUTPATIENT)
Dept: OBGYN | Facility: CLINIC | Age: 42
End: 2022-11-22

## 2022-11-22 LAB — HPV HIGH+LOW RISK DNA PNL CVX: NOT DETECTED

## 2022-12-01 LAB — CYTOLOGY CVX/VAG DOC THIN PREP: NORMAL

## 2023-03-22 NOTE — PHYSICAL EXAM
[Chaperone Present] : A chaperone was present in the examining room during all aspects of the physical examination [FreeTextEntry1] : Goldie Shea MA [Appropriately responsive] : appropriately responsive [Alert] : alert [No Acute Distress] : no acute distress [No Lymphadenopathy] : no lymphadenopathy [Regular Rate Rhythm] : regular rate rhythm [Soft] : soft [Non-tender] : non-tender [No Mass] : no mass [Oriented x3] : oriented x3 [Examination Of The Breasts] : a normal appearance [No Masses] : no breast masses were palpable [Labia Majora] : normal [Labia Minora] : normal [Normal] : normal [Uterine Adnexae] : normal [No Tenderness] : no tenderness [FreeTextEntry9] : Heme negative [Nl Sphincter Tone] : normal sphincter tone

## 2023-03-22 NOTE — HISTORY OF PRESENT ILLNESS
[Patient reported PAP Smear was normal] : Patient reported PAP Smear was normal [Patient reported colonoscopy was normal] : Patient reported colonoscopy was normal [N] : Patient reports normal menses [No] : never pregnant [FreeTextEntry1] : Megha is here for an annual exam and she is doing well.  [Mammogramdate] : 7/11/22 [TextBox_19] : LT bi rads 1 [BreastSonogramDate] : 7/11/22 [TextBox_25] : Lt bi rads 2 [PapSmeardate] : 11/17/21 [ColonoscopyDate] : 5/18/17 [LMPDate] : 11/2/22 [MensesFreq] : 28 [MensesLength] : 3 [PGxTotal] : 1 [[facility]exFulerm] : 1 [[facility] CenterxLiving] : 2 [PGHxMultBirths] : 1 [TextBox_6] : 11/2/22 [TextBox_9] : 14

## 2023-03-22 NOTE — PLAN
[FreeTextEntry1] : -normal exam\par -pap collected\par -discussed proper hydration, pelvic floor and cardioexercises. \par -repeat breast imaging in 7/2023

## 2023-03-29 ENCOUNTER — NON-APPOINTMENT (OUTPATIENT)
Age: 43
End: 2023-03-29

## 2023-11-16 ENCOUNTER — NON-APPOINTMENT (OUTPATIENT)
Age: 43
End: 2023-11-16

## 2023-12-22 ENCOUNTER — APPOINTMENT (OUTPATIENT)
Dept: BREAST CENTER | Facility: CLINIC | Age: 43
End: 2023-12-22
Payer: COMMERCIAL

## 2023-12-22 VITALS
HEIGHT: 62 IN | TEMPERATURE: 97.3 F | BODY MASS INDEX: 23.55 KG/M2 | HEART RATE: 88 BPM | SYSTOLIC BLOOD PRESSURE: 112 MMHG | DIASTOLIC BLOOD PRESSURE: 76 MMHG | WEIGHT: 128 LBS

## 2023-12-22 DIAGNOSIS — R92.333 MAMMOGRAPHIC HETEROGENEOUS DENSITY, BILATERAL BREASTS: ICD-10-CM

## 2023-12-22 DIAGNOSIS — N60.02 SOLITARY CYST OF LEFT BREAST: ICD-10-CM

## 2023-12-22 PROCEDURE — 99213 OFFICE O/P EST LOW 20 MIN: CPT

## 2023-12-22 NOTE — HISTORY OF PRESENT ILLNESS
[FreeTextEntry1] : Referred by Dr. Dajuan Hubbard   Pt is a 43-year-old female here today for follow up after most recent studies. She denies any breast lesions, discharge or masses. She notes pruritis to nipples, worse on right, she has been moisturizing with lotion. She notes no changes in her overall health within the last year.  2/5/22, [place], Bilat sMMG: TC 24.2%, ext dense, no susp findings, prominent L axillary LN partially imaged likely r/t recent covid vaccine; Bilat U/S: R no susp solid mass, L no susp solid mass, 11:00 2N 4mm cyst, rec mmg 1 year and L axillary U/S if adenopathy persists, BR2  7/11/22 ZP, left mmg, extremely dense, Br1. US cystic cluster, and stable benign-appearing hypoechoic nodule/cyst Br2  3/18/23 ZP, Bilat sMMG: Het dense. Benign appearing calcs. No susp masses or clustered microcalcs are identified bilaterally. No skin thickening or nipple retraction. No significant interval change. Impression: no mmg evidence of malignancy. Rec mmg in 1 yr. BR2 3/18/23 ZP, Bilat US: R- negative. L- 10:00 4cmfn 0.3x0.2x0.4cm complicated cyst, no significant change. There is no evidence of skin thickening or ductal ectasia. No evidence of axillary adenopathy. Impression: subcentimeter cyst L breast. BR2   11/6/23 ZP, L targeted US: 10:00 4cmfn cluster of cysts 1cm, slightly larger. No evidence of skin thickening or ductal ectasia. No evidence of axillary adenopathy. Impression: Small cluster of cysts, slightly larger. F.U US in conjunction with pt's annual mmg is rec. BR2.   Fhx: Cousin with stomach cancer in his 30's. No family history of breast ca.

## 2023-12-22 NOTE — DATA REVIEWED
[FreeTextEntry1] : 11/6/23 ZP, L targeted US: 10:00 4cmfn cluster of cysts 1cm, slightly larger. No evidence of skin thickening or ductal ectasia. No evidence of axillary adenopathy. Impression: Small cluster of cysts, slightly larger. F.U US in conjunction with pt's annual mmg is rec. BR2.

## 2023-12-22 NOTE — PHYSICAL EXAM
[Normocephalic] : normocephalic [Supple] : supple [No Supraclavicular Adenopathy] : no supraclavicular adenopathy [No Thyromegaly] : no thyromegaly [Examined in the supine and seated position] : examined in the supine and seated position [Symmetrical] : symmetrical [Bra Size: ___] : Bra Size: [unfilled] [No dominant masses] : no dominant masses in right breast  [No dominant masses] : no dominant masses left breast [No Nipple Retraction] : no left nipple retraction [No Nipple Discharge] : no left nipple discharge [No Axillary Lymphadenopathy] : no left axillary lymphadenopathy [No Edema] : no edema [No Swelling] : no swelling [Full ROM] : full range of motion [No Rashes] : no rashes [No Ulceration] : no ulceration [de-identified] : \par  Neuro: intact grossly, gait normal

## 2023-12-22 NOTE — ASSESSMENT
[FreeTextEntry1] : Referred by Dr. Dajuan Hubbard   Pt is a 43-year-old female here today for follow up after most recent studies. She denies any breast lesions, discharge or masses. She notes pruritis to nipples, worse on right, she has been moisturizing with lotion. She notes no changes in her overall health within the last year.  2/5/22, [facility], Bilat sMMG: TC 24.2%, ext dense, no susp findings, prominent L axillary LN partially imaged likely r/t recent covid vaccine; Bilat U/S: R no susp solid mass, L no susp solid mass, 11:00 2N 4mm cyst, rec mmg 1 year and L axillary U/S if adenopathy persists, BR2  7/11/22 [facility], left mmg, extremely dense, Br1. US cystic cluster, and stable benign-appearing hypoechoic nodule/cyst Br2  3/18/23 [facility], Bilat sMMG: Het dense. Benign appearing calcs. No susp masses or clustered microcalcs are identified bilaterally. No skin thickening or nipple retraction. No significant interval change. Impression: no mmg evidence of malignancy. Rec mmg in 1 yr. BR2 3/18/23 Z[facility], Bilat US: R- negative. L- 10:00 4cmfn 0.3x0.2x0.4cm complicated cyst, no significant change. There is no evidence of skin thickening or ductal ectasia. No evidence of axillary adenopathy. Impression: subcentimeter cyst L breast. BR2   11/6/23 ZP, L targeted US: 10:00 4cmfn cluster of cysts 1cm, slightly larger. No evidence of skin thickening or ductal ectasia. No evidence of axillary adenopathy. Impression: Small cluster of cysts, slightly larger. F.U US in conjunction with pt's annual mmg is rec. BR2.   Fhx: Cousin with stomach cancer in his 30's.  No family history of breast ca.   CBE: FCC breast, no palpable masses. No SC or axillary adenopathy.  Reviewed recent studies and CBE with patient, LM. Recommend continue to moisturize for nipple pruritis, patient states pruritis/dryness is worse in winter months, no dryness ntoed on exam. Megha will be due for her screening mmg and US 3/20/24, orders place. Will call with results, if studies within normal limits she can resume follow up with her GYN. Recommend she schedule appt for annual as it appears she did not have GYN exam this year. Follo up with our office as needed.

## 2025-01-30 ENCOUNTER — APPOINTMENT (OUTPATIENT)
Dept: OBGYN | Facility: CLINIC | Age: 45
End: 2025-01-30
Payer: COMMERCIAL

## 2025-01-30 VITALS
HEIGHT: 62 IN | BODY MASS INDEX: 22.26 KG/M2 | DIASTOLIC BLOOD PRESSURE: 80 MMHG | SYSTOLIC BLOOD PRESSURE: 114 MMHG | WEIGHT: 121 LBS

## 2025-01-30 DIAGNOSIS — N92.0 EXCESSIVE AND FREQUENT MENSTRUATION WITH REGULAR CYCLE: ICD-10-CM

## 2025-01-30 DIAGNOSIS — R39.89 OTHER SYMPTOMS AND SIGNS INVOLVING THE GENITOURINARY SYSTEM: ICD-10-CM

## 2025-01-30 DIAGNOSIS — Z12.4 ENCOUNTER FOR SCREENING FOR MALIGNANT NEOPLASM OF CERVIX: ICD-10-CM

## 2025-01-30 DIAGNOSIS — E05.00 THYROTOXICOSIS WITH DIFFUSE GOITER W/OUT THYROTOXIC CRISIS OR STORM: ICD-10-CM

## 2025-01-30 DIAGNOSIS — Z12.12 ENCOUNTER FOR SCREENING FOR MALIGNANT NEOPLASM OF RECTUM: ICD-10-CM

## 2025-01-30 DIAGNOSIS — Z12.39 ENCOUNTER FOR OTHER SCREENING FOR MALIGNANT NEOPLASM OF BREAST: ICD-10-CM

## 2025-01-30 DIAGNOSIS — Z87.09 PERSONAL HISTORY OF OTHER DISEASES OF THE RESPIRATORY SYSTEM: ICD-10-CM

## 2025-01-30 DIAGNOSIS — D64.9 ANEMIA, UNSPECIFIED: ICD-10-CM

## 2025-01-30 DIAGNOSIS — Z86.39 PERSONAL HISTORY OF OTHER ENDOCRINE, NUTRITIONAL AND METABOLIC DISEASE: ICD-10-CM

## 2025-01-30 DIAGNOSIS — R92.333 MAMMOGRAPHIC HETEROGENEOUS DENSITY, BILATERAL BREASTS: ICD-10-CM

## 2025-01-30 PROCEDURE — 58100 BIOPSY OF UTERUS LINING: CPT

## 2025-01-30 PROCEDURE — 99459 PELVIC EXAMINATION: CPT

## 2025-01-30 PROCEDURE — 82270 OCCULT BLOOD FECES: CPT

## 2025-01-30 PROCEDURE — 99396 PREV VISIT EST AGE 40-64: CPT | Mod: 25

## 2025-01-31 LAB — HPV HIGH+LOW RISK DNA PNL CVX: NOT DETECTED

## 2025-02-01 ENCOUNTER — NON-APPOINTMENT (OUTPATIENT)
Age: 45
End: 2025-02-01

## 2025-02-03 LAB — CORE LAB BIOPSY: NORMAL

## 2025-02-04 LAB — CYTOLOGY CVX/VAG DOC THIN PREP: ABNORMAL

## 2025-03-04 ENCOUNTER — APPOINTMENT (OUTPATIENT)
Dept: OBGYN | Facility: CLINIC | Age: 45
End: 2025-03-04
Payer: COMMERCIAL

## 2025-03-04 VITALS
WEIGHT: 122 LBS | HEIGHT: 62 IN | BODY MASS INDEX: 22.45 KG/M2 | SYSTOLIC BLOOD PRESSURE: 117 MMHG | DIASTOLIC BLOOD PRESSURE: 77 MMHG

## 2025-03-04 DIAGNOSIS — N92.0 EXCESSIVE AND FREQUENT MENSTRUATION WITH REGULAR CYCLE: ICD-10-CM

## 2025-03-04 PROCEDURE — 99214 OFFICE O/P EST MOD 30 MIN: CPT

## 2025-05-09 ENCOUNTER — TRANSCRIPTION ENCOUNTER (OUTPATIENT)
Age: 45
End: 2025-05-09

## 2025-05-09 ENCOUNTER — RESULT REVIEW (OUTPATIENT)
Age: 45
End: 2025-05-09

## 2025-05-09 ENCOUNTER — APPOINTMENT (OUTPATIENT)
Dept: OBGYN | Facility: HOSPITAL | Age: 45
End: 2025-05-09

## 2025-05-10 ENCOUNTER — NON-APPOINTMENT (OUTPATIENT)
Age: 45
End: 2025-05-10

## 2025-05-21 ENCOUNTER — APPOINTMENT (OUTPATIENT)
Dept: OBGYN | Facility: CLINIC | Age: 45
End: 2025-05-21
Payer: COMMERCIAL

## 2025-05-21 VITALS
BODY MASS INDEX: 23 KG/M2 | HEIGHT: 62 IN | DIASTOLIC BLOOD PRESSURE: 76 MMHG | WEIGHT: 125 LBS | SYSTOLIC BLOOD PRESSURE: 114 MMHG

## 2025-05-21 DIAGNOSIS — Z97.5 PRESENCE OF (INTRAUTERINE) CONTRACEPTIVE DEVICE: ICD-10-CM

## 2025-05-21 DIAGNOSIS — Z12.39 ENCOUNTER FOR OTHER SCREENING FOR MALIGNANT NEOPLASM OF BREAST: ICD-10-CM

## 2025-05-21 DIAGNOSIS — N60.02 SOLITARY CYST OF LEFT BREAST: ICD-10-CM

## 2025-05-21 DIAGNOSIS — Z98.890 OTHER SPECIFIED POSTPROCEDURAL STATES: ICD-10-CM

## 2025-05-21 PROCEDURE — 99202 OFFICE O/P NEW SF 15 MIN: CPT
